# Patient Record
Sex: FEMALE | URBAN - METROPOLITAN AREA
[De-identification: names, ages, dates, MRNs, and addresses within clinical notes are randomized per-mention and may not be internally consistent; named-entity substitution may affect disease eponyms.]

---

## 2021-01-26 ENCOUNTER — APPOINTMENT (RX ONLY)
Dept: URBAN - METROPOLITAN AREA CLINIC 77 | Facility: CLINIC | Age: 31
Setting detail: DERMATOLOGY
End: 2021-01-26

## 2021-01-26 DIAGNOSIS — Z41.9 ENCOUNTER FOR PROCEDURE FOR PURPOSES OTHER THAN REMEDYING HEALTH STATE, UNSPECIFIED: ICD-10-CM

## 2021-01-26 PROCEDURE — ? COSMETIC CONSULTATION: PICOSURE LASER

## 2021-01-26 PROCEDURE — ? COSMETIC CONSULTATION: ACNE SCARRING

## 2021-09-30 ENCOUNTER — RX ONLY (OUTPATIENT)
Age: 31
Setting detail: RX ONLY
End: 2021-09-30

## 2021-10-28 ENCOUNTER — RX ONLY (OUTPATIENT)
Age: 31
Setting detail: RX ONLY
End: 2021-10-28

## 2021-11-05 ENCOUNTER — APPOINTMENT (RX ONLY)
Dept: URBAN - METROPOLITAN AREA CLINIC 76 | Facility: CLINIC | Age: 31
Setting detail: DERMATOLOGY
End: 2021-11-05

## 2021-11-05 DIAGNOSIS — L90.5 SCAR CONDITIONS AND FIBROSIS OF SKIN: ICD-10-CM

## 2021-11-05 PROCEDURE — ? GENTLEWAVES LED PHOTOMODULATION

## 2021-11-05 PROCEDURE — ? PRESCRIPTION

## 2021-11-05 PROCEDURE — ? ADDITIONAL NOTES

## 2021-11-05 PROCEDURE — ? FRAXEL RESTORE DUAL

## 2021-11-05 RX ORDER — PREDNISONE 10 MG/1
TABLET ORAL
Qty: 18 | Refills: 1 | Status: ERX | COMMUNITY
Start: 2021-11-05

## 2021-11-05 RX ORDER — FLUOCINOLONE ACETONIDE, HYDROQUINONE, AND TRETINOIN .1; 40; .5 MG/G; MG/G; MG/G
CREAM TOPICAL
Qty: 30 | Refills: 2 | Status: ERX | COMMUNITY
Start: 2021-11-05

## 2021-11-05 RX ADMIN — PREDNISONE: 10 TABLET ORAL at 00:00

## 2021-11-05 RX ADMIN — FLUOCINOLONE ACETONIDE, HYDROQUINONE, AND TRETINOIN: .1; 40; .5 CREAM TOPICAL at 00:00

## 2021-11-05 ASSESSMENT — LOCATION DETAILED DESCRIPTION DERM
LOCATION DETAILED: LEFT INFERIOR CENTRAL MALAR CHEEK
LOCATION DETAILED: LEFT SUPERIOR CENTRAL BUCCAL CHEEK

## 2021-11-05 ASSESSMENT — LOCATION SIMPLE DESCRIPTION DERM: LOCATION SIMPLE: LEFT CHEEK

## 2021-11-05 ASSESSMENT — LOCATION ZONE DERM: LOCATION ZONE: FACE

## 2021-11-05 NOTE — PROCEDURE: ADDITIONAL NOTES
Additional Notes: Additional Settings:\\nSurface area: 338cm2,  23% coverage, estimated KJ- 6.2
Detail Level: Detailed
Render Risk Assessment In Note?: no

## 2021-11-05 NOTE — PROCEDURE: GENTLEWAVES LED PHOTOMODULATION
Detail Level: Zone
Treatment Number (Will Not Render If 0): 1
Protocol (Optional): Post-Fractionated Laser Treatment
Price (Use Numbers Only, No Special Characters Or $): 0
Post-Care Instructions: I reviewed with the patient in detail post-care instructions. Patient should avoid sun for a minimum of 4 weeks before and after treatment.
Consent: Prior to proceeding with GentlWaves LED photomodulation consent was obtained.

## 2021-11-05 NOTE — PROCEDURE: FRAXEL RESTORE DUAL
Price (Use Numbers Only, No Special Characters Or $): 1100.00
Detail Level: Zone
Energy In Mj (Optional): 70
Actual Kj Used (Optional): 5.83
Topical Anesthesia?: 20% lidocaine
Treatment Number (Optional): 1
Passes (Optional): 8
Consent: Written consent obtained, risks reviewed including but not limited to crusting, scabbing, blistering, scarring, darker or lighter pigmentary change, and/or incomplete removal.
Length Topical Anesthesia Applied (Optional): 45 minutes
Post-Care Instructions: I reviewed with the patient in detail post-care instructions.

## 2021-12-03 ENCOUNTER — APPOINTMENT (RX ONLY)
Dept: URBAN - METROPOLITAN AREA CLINIC 76 | Facility: CLINIC | Age: 31
Setting detail: DERMATOLOGY
End: 2021-12-03

## 2021-12-03 DIAGNOSIS — L90.5 SCAR CONDITIONS AND FIBROSIS OF SKIN: ICD-10-CM

## 2021-12-03 PROCEDURE — ? GENTLEWAVES LED PHOTOMODULATION

## 2021-12-03 PROCEDURE — ? FRAXEL RESTORE DUAL

## 2021-12-03 PROCEDURE — ? ADDITIONAL NOTES

## 2021-12-03 ASSESSMENT — LOCATION DETAILED DESCRIPTION DERM
LOCATION DETAILED: LEFT SUPERIOR CENTRAL BUCCAL CHEEK
LOCATION DETAILED: LEFT INFERIOR CENTRAL MALAR CHEEK

## 2021-12-03 ASSESSMENT — LOCATION SIMPLE DESCRIPTION DERM: LOCATION SIMPLE: LEFT CHEEK

## 2021-12-03 ASSESSMENT — LOCATION ZONE DERM: LOCATION ZONE: FACE

## 2021-12-03 NOTE — PROCEDURE: ADDITIONAL NOTES
Detail Level: Detailed
Additional Notes: Additional Settings:\\nSurface area: 338cm2,  23% coverage, estimated KJ- 6.2
Render Risk Assessment In Note?: no

## 2021-12-03 NOTE — PROCEDURE: FRAXEL RESTORE DUAL
Price (Use Numbers Only, No Special Characters Or $): 1100.00
Treatment Number (Optional): 2
Passes (Optional): 9
Length Topical Anesthesia Applied (Optional): 45 minutes
Actual Kj Used (Optional): 5.83
Detail Level: Zone
Treatment Level (Optional): 8
Post-Care Instructions: I reviewed with the patient in detail post-care instructions.
Consent: Written consent obtained, risks reviewed including but not limited to crusting, scabbing, blistering, scarring, darker or lighter pigmentary change, and/or incomplete removal.
Topical Anesthesia?: 20% lidocaine
Energy In Mj (Optional): 70

## 2021-12-03 NOTE — PROCEDURE: GENTLEWAVES LED PHOTOMODULATION
Consent: Prior to proceeding with GentlWaves LED photomodulation consent was obtained.
External Cooling Fan Speed: 0
Post-Care Instructions: I reviewed with the patient in detail post-care instructions. Patient should avoid sun for a minimum of 4 weeks before and after treatment.
Detail Level: Zone
Treatment Number (Will Not Render If 0): 1
Protocol (Optional): Post-Fractionated Laser Treatment

## 2021-12-10 ENCOUNTER — RX ONLY (OUTPATIENT)
Age: 31
Setting detail: RX ONLY
End: 2021-12-10

## 2021-12-10 RX ORDER — DAPSONE 75 MG/G
GEL TOPICAL
Qty: 60 | Refills: 2 | Status: CANCELLED | COMMUNITY
Start: 2021-12-10

## 2021-12-28 ENCOUNTER — RX ONLY (OUTPATIENT)
Age: 31
Setting detail: RX ONLY
End: 2021-12-28

## 2021-12-28 RX ORDER — DAPSONE 75 MG/G
GEL TOPICAL
Qty: 60 | Refills: 2 | Status: ERX

## 2022-02-11 ENCOUNTER — APPOINTMENT (RX ONLY)
Dept: URBAN - METROPOLITAN AREA CLINIC 76 | Facility: CLINIC | Age: 32
Setting detail: DERMATOLOGY
End: 2022-02-11

## 2022-02-11 DIAGNOSIS — L90.5 SCAR CONDITIONS AND FIBROSIS OF SKIN: ICD-10-CM

## 2022-02-11 PROCEDURE — ? ADDITIONAL NOTES

## 2022-02-11 PROCEDURE — ? GENTLEWAVES LED PHOTOMODULATION

## 2022-02-11 PROCEDURE — ? FRAXEL RESTORE DUAL

## 2022-02-11 ASSESSMENT — LOCATION SIMPLE DESCRIPTION DERM: LOCATION SIMPLE: LEFT CHEEK

## 2022-02-11 ASSESSMENT — LOCATION ZONE DERM: LOCATION ZONE: FACE

## 2022-02-11 NOTE — PROCEDURE: FRAXEL RESTORE DUAL
Energy In Mj (Optional): 70
Consent: Written consent obtained, risks reviewed including but not limited to crusting, scabbing, blistering, scarring, darker or lighter pigmentary change, and/or incomplete removal.
Treatment Level (Optional): 8
Detail Level: Zone
Topical Anesthesia?: 20% lidocaine
Passes (Optional): 9
Treatment Number (Optional): 3
Price (Use Numbers Only, No Special Characters Or $): 1100.00
Length Topical Anesthesia Applied (Optional): 45 minutes
Actual Kj Used (Optional): 6.05
Post-Care Instructions: I reviewed with the patient in detail post-care instructions.

## 2022-02-11 NOTE — PROCEDURE: GENTLEWAVES LED PHOTOMODULATION
Treatment Number (Will Not Render If 0): 3
Detail Level: Zone
Protocol (Optional): Post-Fractionated Laser Treatment
Price (Use Numbers Only, No Special Characters Or $): 0
Consent: Prior to proceeding with GentlWaves LED photomodulation consent was obtained.
Post-Care Instructions: I reviewed with the patient in detail post-care instructions. Patient should avoid sun for a minimum of 4 weeks before and after treatment.

## 2022-02-11 NOTE — PROCEDURE: ADDITIONAL NOTES
Additional Notes: Settings kept the same at this appointment.  Patient states no issues with PIH and upon exam today there are no signs of this.  Will plan to increase settings to 26% at fourth Fraxel treatment if still no issues with PIH.
Additional Notes: Additional Settings:\\nSurface area: 338cm2,  23% coverage, estimated KJ- 6.2
Detail Level: Detailed
Render Risk Assessment In Note?: no

## 2022-03-28 ENCOUNTER — RX ONLY (OUTPATIENT)
Age: 32
Setting detail: RX ONLY
End: 2022-03-28

## 2022-03-28 RX ORDER — DAPSONE 75 MG/G
GEL TOPICAL
Qty: 60 | Refills: 2 | Status: ERX | COMMUNITY
Start: 2022-03-28

## 2022-05-24 ENCOUNTER — APPOINTMENT (RX ONLY)
Dept: URBAN - METROPOLITAN AREA CLINIC 76 | Facility: CLINIC | Age: 32
Setting detail: DERMATOLOGY
End: 2022-05-24

## 2022-05-24 DIAGNOSIS — L90.5 SCAR CONDITIONS AND FIBROSIS OF SKIN: ICD-10-CM

## 2022-05-24 PROCEDURE — ? ADDITIONAL NOTES

## 2022-05-24 PROCEDURE — ? FRAXEL RESTORE DUAL

## 2022-05-24 PROCEDURE — ? GENTLEWAVES LED PHOTOMODULATION

## 2022-05-24 PROCEDURE — ? INVENTORY

## 2022-05-24 ASSESSMENT — LOCATION DETAILED DESCRIPTION DERM
LOCATION DETAILED: LEFT INFERIOR CENTRAL MALAR CHEEK
LOCATION DETAILED: LEFT LATERAL BUCCAL CHEEK
LOCATION DETAILED: LEFT SUPERIOR CENTRAL BUCCAL CHEEK

## 2022-05-24 ASSESSMENT — LOCATION ZONE DERM: LOCATION ZONE: FACE

## 2022-05-24 ASSESSMENT — LOCATION SIMPLE DESCRIPTION DERM: LOCATION SIMPLE: LEFT CHEEK

## 2022-05-24 NOTE — PROCEDURE: FRAXEL RESTORE DUAL
Energy In Mj (Optional): 70
Consent: Written consent obtained, risks reviewed including but not limited to crusting, scabbing, blistering, scarring, darker or lighter pigmentary change, and/or incomplete removal.
Treatment Level (Optional): 7
Detail Level: Zone
Topical Anesthesia?: 20% lidocaine
Passes (Optional): 8
Treatment Number (Optional): 4
Length Topical Anesthesia Applied (Optional): 45 minutes
Actual Kj Used (Optional): 2.84
Post-Care Instructions: I reviewed with the patient in detail post-care instructions.
Energy In Mj (Optional): 30
Eye Shielding Text (Leave Blank If Unwanted- Will Be Inserted If Selecting Eye Shields): The intraocular eye shields were placed. 2 drops of intraocular tetracaine HCL ophthalmic 0.5% solution was administered. The eye shields were coated with ophthalmic bacitracin prior to insertion. After the shields were removed the eyes were flushed with normal saline.
Actual Kj Used (Optional): 0.65

## 2022-05-24 NOTE — PROCEDURE: GENTLEWAVES LED PHOTOMODULATION
Treatment Number (Will Not Render If 0): 4
Detail Level: Zone
Protocol (Optional): Post-Fractionated Laser Treatment
Price (Use Numbers Only, No Special Characters Or $): 0
Consent: Prior to proceeding with GentlWaves LED photomodulation consent was obtained.
Post-Care Instructions: I reviewed with the patient in detail post-care instructions. Patient should avoid sun for a minimum of 4 weeks before and after treatment.

## 2022-05-24 NOTE — PROCEDURE: ADDITIONAL NOTES
Additional Notes: Additional Settings done for majority of the face:\\nSurface area: 226cm2,  20% coverage, estimated KJ- 4.2
Detail Level: Detailed
Render Risk Assessment In Note?: no
Detail Level: Simple
Additional Notes: Additional settings for the mandibular cheeks and jawline:\\n72cm2, 20% coverage, estimated KJ 1.1
Additional Notes: Patient advised to start using the Tri-Christi prescription that was called in prior for her.  Patient told to start with a test area first then proceed accordingly from there.  Patient advised to follow up in 6 weeks to re-evaluate for her PIH

## 2022-07-12 ENCOUNTER — APPOINTMENT (RX ONLY)
Dept: URBAN - METROPOLITAN AREA CLINIC 76 | Facility: CLINIC | Age: 32
Setting detail: DERMATOLOGY
End: 2022-07-12

## 2022-07-12 DIAGNOSIS — L90.5 SCAR CONDITIONS AND FIBROSIS OF SKIN: ICD-10-CM

## 2022-07-12 PROCEDURE — ? ADDITIONAL NOTES

## 2022-07-12 NOTE — PROCEDURE: ADDITIONAL NOTES
Detail Level: Simple
Additional Notes: On exam today, patient has melasma on zygomatic and malar cheeks that she did not have before Fraxel treatment. Looking at baseline photographs documented from November 5th, 2021, patient did have noticeable melasma on forehead and temples but not on her cheeks. She did not notice to have any worsening melasma or PIH until she presented for her fourth Fraxel treatment on May 24th, 2022. She states that she never started the Tri-vaishnavi that we recommended that day and prescribed for her. \\n\\nToday, patient does have melasma type hyperpigmentation on lateral forehead including temples and zygomatic/malar cheeks. There is none on her central forehead that was present on the baseline photos. \\n\\nPlan: continue Tri-vaishnavi as many nights as she can tolerate. Patient can contact us if no improvement and she can try a kojic acid bleach. Discussed [employer] mineral base sunscreens and [employer] chemical peels to do here at the office with Alma. Patient will come back in November for her 6 month Fraxel follow up.  If no improvement in the meantime with melasma, patient can contact us beforehand and we can do Fraxel thulium test sites.
Render Risk Assessment In Note?: no

## 2022-08-22 ENCOUNTER — APPOINTMENT (RX ONLY)
Dept: URBAN - METROPOLITAN AREA CLINIC 76 | Facility: CLINIC | Age: 32
Setting detail: DERMATOLOGY
End: 2022-08-22

## 2022-08-22 DIAGNOSIS — Z41.9 ENCOUNTER FOR PROCEDURE FOR PURPOSES OTHER THAN REMEDYING HEALTH STATE, UNSPECIFIED: ICD-10-CM

## 2022-08-22 PROCEDURE — ? INVENTORY

## 2022-08-22 PROCEDURE — ? CHEMICAL PEEL

## 2022-08-22 ASSESSMENT — LOCATION ZONE DERM: LOCATION ZONE: FACE

## 2022-08-22 ASSESSMENT — LOCATION SIMPLE DESCRIPTION DERM: LOCATION SIMPLE: RIGHT FOREHEAD

## 2022-08-22 ASSESSMENT — LOCATION DETAILED DESCRIPTION DERM: LOCATION DETAILED: RIGHT MEDIAL FOREHEAD

## 2022-08-22 NOTE — PROCEDURE: CHEMICAL PEEL
Number Of Layers: 2
Expiration Date (Optional): 10/31/2023
Lot Number (Optional): [id number]
Detail Level: Zone
Chemical Peel: [employer] Vitalize
Treatment Number: 1
Post-Care Instructions: I reviewed with the patient in detail post-care instructions. Patient should avoid sun exposure and wear sun protection.
Consent: Prior to the procedure, written consent was obtained and risks were reviewed, including but not limited to: redness, peeling, blistering, pigmentary change, scarring, infection, and pain.
Prep: The treated area was degreased with pre-peel cleanser, and vaseline was applied for protection of mucous membranes.
Post Peel Care: After the procedure, a post-peel cream was applied to the treated areas. Sun protection and post-care instructions were reviewed with the patient.

## 2022-11-08 ENCOUNTER — APPOINTMENT (RX ONLY)
Dept: URBAN - METROPOLITAN AREA CLINIC 76 | Facility: CLINIC | Age: 32
Setting detail: DERMATOLOGY
End: 2022-11-08

## 2022-11-08 ENCOUNTER — RX ONLY (OUTPATIENT)
Age: 32
Setting detail: RX ONLY
End: 2022-11-08

## 2022-11-08 DIAGNOSIS — L90.5 SCAR CONDITIONS AND FIBROSIS OF SKIN: ICD-10-CM

## 2022-11-08 PROCEDURE — ? ADDITIONAL NOTES

## 2022-11-08 RX ORDER — FLUOCINOLONE ACETONIDE, HYDROQUINONE, AND TRETINOIN .1; 40; .5 MG/G; MG/G; MG/G
CREAM TOPICAL
Qty: 30 | Refills: 2 | Status: ERX | COMMUNITY
Start: 2022-11-08

## 2022-11-08 NOTE — PROCEDURE: ADDITIONAL NOTES
Detail Level: Simple
Additional Notes: On exam today, patient has resolution of the majority of her acne scarring. PIH/melasma that patient had distributed on her temples and forehead has resolved. Patient does have some melasma pigmentation on her forehead but it is significantly better than baseline photographs taken in November 2021. She has improvement of the fine lines around her eyes and has no pitting from Fraxel laser. TriLuma was stopped several months ago by patient due to her not being able to get a refill. She states that she tried to contact our office with no luck. \\n\\nPlan: Advised patient to tell Alma for the next chemical peel to use without retinoic acid. Patient states she became very scaly after the chemical peel. Patient will resume her TriLuma and a refill for her was sent to her preferred pharmacy today. Patient will continue to use sunscreen everyday. \\n\\nAdvised patient she can continue getting 6 month-1 year Fraxel treatments done. Patient states she would like to continue with this and she will get scheduled soon for another treatment.
Render Risk Assessment In Note?: no

## 2022-12-01 NOTE — PROCEDURE: COSMETIC CONSULTATION: ACNE SCARRING
Thank you for letting us take care of you today. We hope all your questions were addressed. If a question was overlooked or something else comes to mind after you return home, please contact a member of your Care Team listed below. Your Care Team at [place] [address] is Team #3  Ceci Power MD (Faculty)  eJss Williamson MD (Faculty  Adelaarielle Robert MD (Resident)  Nadiya Garcia (Resident)   Trevor Bhakta MD (Resident)  Tab Valiente., RASHAD Pulido., RASHAD Forde., NAS Rosales., Charles Gomez., Cynthia Huizar ([facility])  Mikel Krishna, [address] [facility] (Clinical Practice Manager)  Cassie Nunez [facility] (Clinical Pharmacist)     Office phone number: [phone]    If you need to get in right away due to illness, please be advised we have \"Same Day\" appointments available Monday-Friday. Please call us at [phone] option #3 to schedule your \"Same Day\" appointment.
Detail Level: Detailed

## 2023-01-27 ENCOUNTER — RX ONLY (OUTPATIENT)
Age: 33
Setting detail: RX ONLY
End: 2023-01-27

## 2023-01-27 RX ORDER — DAPSONE 75 MG/G
GEL TOPICAL
Qty: 60 | Refills: 2 | Status: ERX